# Patient Record
Sex: MALE | URBAN - METROPOLITAN AREA
[De-identification: names, ages, dates, MRNs, and addresses within clinical notes are randomized per-mention and may not be internally consistent; named-entity substitution may affect disease eponyms.]

---
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
## 2022-04-10 ENCOUNTER — HISTORICAL (OUTPATIENT)
Dept: ADMINISTRATIVE | Facility: HOSPITAL | Age: 22
End: 2022-04-10

## 2022-04-25 VITALS
DIASTOLIC BLOOD PRESSURE: 72 MMHG | WEIGHT: 175.69 LBS | BODY MASS INDEX: 21.39 KG/M2 | SYSTOLIC BLOOD PRESSURE: 122 MMHG | HEIGHT: 76 IN

## 2022-05-03 NOTE — HISTORICAL [facility] CERNER
This is a historical note converted from [employer]. Formatting and pictures may have been removed.  Please reference [employer] for original formatting and attached multimedia. Chief Complaint  WELLNESS CPX IMMUNIZATIONS  History of Present Illness  18 year old WM presents for annual wellness  No PMH  ?  Starting school at [employer] in [place]  No Tob, No EtOH  No exercise  ?  No complaints  Review of Systems  Constitutional:?no fever, fatigue, weakness  Eye:?no vision loss, eye redness, drainage, or pain  ENMT:?no sore throat, ear pain, sinus pain/congestion, nasal congestion/drainage  Respiratory:?no cough, no wheezing, no shortness of breath  Cardiovascular:?no chest pain, no palpitations, no edema  Gastrointestinal:?no nausea, vomiting, or diarrhea. No abdominal pain  Genitourinary:?no dysuria, no urinary frequency or urgency, no hematuria  Hema/Lymph:?no abnormal bruising or bleeding  Endocrine:?no heat or cold intolerance, no excessive thirst or excessive urination  Musculoskeletal:?no muscle or joint pain, no joint swelling  Integumentary:?no skin rash or abnormal lesion  Neurologic: no headache, no dizziness, no weakness or numbness  ?  Physical Exam  Vitals & Measurements  T:?36.9? ?C (Oral)? HR:?64(Peripheral)? BP:?106/64?  HT:?193?cm? HT:?193?cm? WT:?73.4?kg? WT:?73.4?kg? BMI:?19.71?  General:?well-developed well-nourished in no acute distress  Eye: PERRLA, EOMI, clear conjunctiva, eyelids normal  HENT:?TMs/ear canals clear, oropharynx without erythema/exudate, oropharynx and nasal mucosal surfaces moist, no maxillary/frontal sinus tenderness to palpation  Neck: full range of motion, no thyromegaly or lymphadenopathy  Respiratory:?clear to auscultation bilaterally  Cardiovascular:?regular rate and rhythm without murmurs, gallops or rubs  Gastrointestinal:?soft, non-tender, non-distended with normal bowel sounds, without masses to palpation  Genitourinary: no CVA tenderness to  palpation  Musculoskeletal:?full range of motion of all extremities/spine without limitation or discomfort  Integumentary: no rashes or skin lesions present  Neurologic: cranial nerves intact, no signs of peripheral neurological deficit, motor/sensory function intact  ?  Assessment/Plan  1.?Well adult exam  ?LABS: CBC, CMP, TSH  ?Menactra 2nd dose today  ?Trumemba (1st dose today)  ?2nd dose- 1 month  ?3rd dose- 6 months  Ordered:  Automated Diff, Routine collect, 07/03/18 13:59:00 CDT, Blood, Collected, Stop date 07/03/18 13:59:00 CDT, Lab Collect, Well adult exam, 07/03/18 13:59:00 CDT  CBC w/ Auto Diff, Routine collect, 07/03/18 13:59:00 CDT, Blood, Stop date 07/03/18 13:59:00 CDT, Lab Collect, Well adult exam, 07/03/18 13:59:00 CDT  Comprehensive Metabolic Panel, Routine collect, 07/03/18 13:59:00 CDT, Blood, Stop date 07/03/18 13:59:00 CDT, Lab Collect, Well adult exam, 07/03/18 13:59:00 CDT  Thyroid Stimulating Hormone, Routine collect, 07/03/18 13:59:00 CDT, Blood, Stop date 07/03/18 13:59:00 CDT, Lab Collect, Well adult exam, 07/03/18 13:59:00 CDT  ?  Encounter for immunization  ?   Problem List/Past Medical History  Ongoing  ADD (attention deficit disorder)  Historical  No qualifying data  Medications  No active medications  Allergies  No Known Medication Allergies  Social History  Alcohol  Current, 1-2 times per month, 07/03/2018  Employment/School  Student, 07/03/2018  Home/Environment  Lives with Father, Mother, Siblings., 07/03/2018  Nutrition/Health  Regular, 07/03/2018  Substance Abuse  Never, 07/03/2018  Tobacco  Never smoker Use:., 07/03/2018  Family History  Hypertension.: Grandmother.  Immunizations  Vaccine Date Status   meningococcal group B vaccine 07/03/2018 Given   meningococcal conjugate vaccine 07/03/2018 Given   varicella virus vaccine 06/06/2011 Recorded   meningococcal conjugate vaccine 06/06/2011 Recorded   poliovirus vaccine, inactivated 03/08/2004 Recorded   measles/mumps/rubella  virus vaccine 03/08/2004 Recorded   diphtheria/pertussis, acel/tetanus ped 03/08/2004 Recorded   poliovirus vaccine, inactivated 05/16/2001 Recorded   diphtheria/pertussis, acel/tetanus ped 05/16/2001 Recorded   pneumococcal 7-valent vaccine 05/16/2001 Recorded   haemophilus b-hepatitis B vaccine 02/01/2001 Recorded   measles/mumps/rubella virus vaccine 02/01/2001 Recorded   pneumococcal 7-valent vaccine 02/01/2001 Recorded   varicella virus vaccine 02/01/2001 Recorded   diphtheria/pertussis, acel/tetanus ped 2000 Recorded   pneumococcal 7-valent vaccine 2000 Recorded   pneumococcal 7-valent vaccine 2000 Recorded   diphtheria/pertussis, acel/tetanus ped 2000 Recorded   Health Maintenance  Health Maintenance  ???Pending?(in the next year)  ??? ??Due?  ??? ? ? ?Alcohol Misuse Screening due??07/03/18??and every 1??year(s)  ??? ? ? ?Depression Screening due??07/03/18??and every 1??year(s)  ??? ??Due In Future?  ??? ? ? ?Influenza Vaccine not due until??10/02/18??and every 1??year(s)  ???Satisfied?(in the past 1 year)  ??? ??Satisfied?  ??? ? ? ?Blood Pressure Screening on??07/03/18.??Satisfied by Radha Hitchcock  ??? ? ? ?Body Mass Index Check on??07/03/18.??Satisfied by SYSTEM  ??? ? ? ?Obesity Screening on??07/03/18.??Satisfied by Radha Hitchcock  ??? ? ? ?Tobacco Use Screening on??07/03/18.??Satisfied by Radha Hitchcock  ?  ?